# Patient Record
Sex: MALE | Race: WHITE
[De-identification: names, ages, dates, MRNs, and addresses within clinical notes are randomized per-mention and may not be internally consistent; named-entity substitution may affect disease eponyms.]

---

## 2019-09-09 ENCOUNTER — HOSPITAL ENCOUNTER (OUTPATIENT)
Dept: HOSPITAL 19 - COL.RAD | Age: 69
End: 2019-09-09
Attending: ANESTHESIOLOGY
Payer: COMMERCIAL

## 2019-09-09 DIAGNOSIS — M51.17: Primary | ICD-10-CM

## 2019-09-09 DIAGNOSIS — M12.88: ICD-10-CM

## 2019-10-02 ENCOUNTER — HOSPITAL ENCOUNTER (OUTPATIENT)
Dept: HOSPITAL 19 - MHCPAIN | Age: 69
End: 2019-10-02
Attending: ANESTHESIOLOGY
Payer: COMMERCIAL

## 2019-10-02 DIAGNOSIS — M47.817: ICD-10-CM

## 2019-10-02 DIAGNOSIS — G89.29: Primary | ICD-10-CM

## 2019-10-02 DIAGNOSIS — M53.3: ICD-10-CM

## 2019-10-02 PROCEDURE — G0463 HOSPITAL OUTPT CLINIC VISIT: HCPCS

## 2019-10-07 ENCOUNTER — HOSPITAL ENCOUNTER (OUTPATIENT)
Dept: HOSPITAL 19 - MHCPAIN | Age: 69
End: 2019-10-07
Attending: ANESTHESIOLOGY
Payer: COMMERCIAL

## 2019-10-07 DIAGNOSIS — M53.3: ICD-10-CM

## 2019-10-07 DIAGNOSIS — M54.16: ICD-10-CM

## 2019-10-07 DIAGNOSIS — M47.817: Primary | ICD-10-CM

## 2019-10-07 PROCEDURE — G0260 INJ FOR SACROILIAC JT ANESTH: HCPCS

## 2019-11-05 ENCOUNTER — HOSPITAL ENCOUNTER (OUTPATIENT)
Dept: HOSPITAL 19 - MHCPAIN | Age: 69
End: 2019-11-05
Attending: ANESTHESIOLOGY
Payer: COMMERCIAL

## 2019-11-05 DIAGNOSIS — G89.29: Primary | ICD-10-CM

## 2019-11-05 DIAGNOSIS — M53.3: ICD-10-CM

## 2019-11-05 DIAGNOSIS — M47.817: ICD-10-CM

## 2019-11-05 PROCEDURE — G0463 HOSPITAL OUTPT CLINIC VISIT: HCPCS

## 2024-09-24 ENCOUNTER — HOSPITAL ENCOUNTER (OUTPATIENT)
Dept: HOSPITAL 19 - SDCO | Age: 74
Setting detail: OBSERVATION
LOS: 2 days | Discharge: HOME | End: 2024-09-26
Attending: UROLOGY | Admitting: UROLOGY
Payer: COMMERCIAL

## 2024-09-24 VITALS — HEART RATE: 73 BPM | TEMPERATURE: 98.7 F | SYSTOLIC BLOOD PRESSURE: 130 MMHG | DIASTOLIC BLOOD PRESSURE: 76 MMHG

## 2024-09-24 VITALS — HEART RATE: 58 BPM | DIASTOLIC BLOOD PRESSURE: 63 MMHG | SYSTOLIC BLOOD PRESSURE: 124 MMHG

## 2024-09-24 VITALS — HEART RATE: 63 BPM | DIASTOLIC BLOOD PRESSURE: 62 MMHG | SYSTOLIC BLOOD PRESSURE: 129 MMHG | TEMPERATURE: 98.3 F

## 2024-09-24 VITALS — SYSTOLIC BLOOD PRESSURE: 129 MMHG | DIASTOLIC BLOOD PRESSURE: 62 MMHG | HEART RATE: 64 BPM

## 2024-09-24 VITALS — DIASTOLIC BLOOD PRESSURE: 60 MMHG | SYSTOLIC BLOOD PRESSURE: 127 MMHG | HEART RATE: 58 BPM

## 2024-09-24 VITALS — DIASTOLIC BLOOD PRESSURE: 73 MMHG | HEART RATE: 59 BPM | TEMPERATURE: 97.3 F | SYSTOLIC BLOOD PRESSURE: 124 MMHG

## 2024-09-24 VITALS — BODY MASS INDEX: 30.97 KG/M2 | WEIGHT: 192.68 LBS | HEIGHT: 66 IN

## 2024-09-24 VITALS — HEART RATE: 59 BPM | SYSTOLIC BLOOD PRESSURE: 126 MMHG | DIASTOLIC BLOOD PRESSURE: 63 MMHG

## 2024-09-24 VITALS — HEART RATE: 63 BPM | DIASTOLIC BLOOD PRESSURE: 62 MMHG | SYSTOLIC BLOOD PRESSURE: 119 MMHG

## 2024-09-24 VITALS — SYSTOLIC BLOOD PRESSURE: 129 MMHG

## 2024-09-24 VITALS — DIASTOLIC BLOOD PRESSURE: 74 MMHG | HEART RATE: 66 BPM | SYSTOLIC BLOOD PRESSURE: 130 MMHG

## 2024-09-24 VITALS — SYSTOLIC BLOOD PRESSURE: 130 MMHG

## 2024-09-24 DIAGNOSIS — K21.9: ICD-10-CM

## 2024-09-24 DIAGNOSIS — C64.1: Primary | ICD-10-CM

## 2024-09-24 DIAGNOSIS — C61: ICD-10-CM

## 2024-09-24 PROCEDURE — A9284 NON-ELECTRONIC SPIROMETER: HCPCS

## 2024-09-24 PROCEDURE — G0378 HOSPITAL OBSERVATION PER HR: HCPCS

## 2024-09-24 PROCEDURE — A4314 CATH W/DRAINAGE 2-WAY LATEX: HCPCS

## 2024-09-24 NOTE — NUR
Pt awake in bed with complaints of pain rating 10/10. Pt states that his pain
is located in the back of his neck. Scheduled Tylenol administered per mar.
Kpad in place on back of pt neck per pt request. Pt expresses frustration with
this nurse regarding pain medication stating that his pain medication is 2
hours late. This nurse educated pt regarding PRN pain medication and that pt
was not due for PRN pain medication and last time given per mar. PRN Ashley
administered per mar. Shift assessment completed. VSS. Kelley catheter in place
draining hazy reddish/pink urine into kelley catheter bag with no
complications. ABD lap sites x5 patent with no swelling, redness, or drainage.
EVA drain intact draining bright red drainage with no complications. Pt has no
request at this time. Call light within reach.

## 2024-09-24 NOTE — NUR
Pt's wife called and was being very short with ASH Mcrae.  She stated pt
called her stating he was in a lot of pain and that his medications were late.
 She wanted to know if she needed to called the Dr herself.  She would not let
me explain that some of his medications are scheduled and that they were given
an an appropriate time and explain PRN medications.  She just continued for
several minutes repeating herself.  Finally I stated that I would need to get
off the phone wit her so that I could go and assess him and see what could be
done for pain medication.  Pts scheduled medications were given at an
appropriate time.  Pt appeared to be resting comfortably.  He then stated that
his pain was 25/10 when I asked him to rate it.  I then stated, "this is the
worst pain you have even been in and that 10 is the highest the scale goes."
Pt then stated, no, not the worst, I'll give it a 9.  Pt then stated that he
has neck problems in which I do see that he does have a k-pad on.  He was able
to get a PRN peace, which I did give at this time.  Pt denies any other needs
at this time, will continue to monitor

## 2024-09-24 NOTE — NUR
Bedside report received from ASH Varner. Pt awake in bed with no complaints. Call
light within reach.

## 2024-09-24 NOTE — NUR
PATIENT UP TO FLOOR AT APPROXIMATELY 1455. POST OP VITALS RUNNING AND WNL.
POST OP FLUIDS INFUSING INTO LEFT AC. PATIENT DROWSY BUT AROUSABLE. PATIENT
TOLERATING ICE CHIPS. NO FURTHER NEEDS. CALL LIGHT IN REACH.

## 2024-09-24 NOTE — NUR
PATIENT ALERT AND ORIENTED BUT VERY DROWSY. PATIENT REPORTS INCREASED PAIN, VS
AND PATIENT'S DROWSINESS APPEAR THAT PAIN IS SOMEWHAT CONTROLLED. PATIENT
RESTING IN BED. IV FLUIDS INFUSING LEFT AC. NO FURTHER NEEDS. CALL LIGHT IN
REACH.

## 2024-09-25 VITALS — TEMPERATURE: 98.1 F | HEART RATE: 59 BPM | DIASTOLIC BLOOD PRESSURE: 66 MMHG | SYSTOLIC BLOOD PRESSURE: 132 MMHG

## 2024-09-25 VITALS — TEMPERATURE: 98.1 F | SYSTOLIC BLOOD PRESSURE: 133 MMHG | HEART RATE: 57 BPM | DIASTOLIC BLOOD PRESSURE: 68 MMHG

## 2024-09-25 VITALS — TEMPERATURE: 98.3 F | SYSTOLIC BLOOD PRESSURE: 159 MMHG | DIASTOLIC BLOOD PRESSURE: 67 MMHG | HEART RATE: 57 BPM

## 2024-09-25 VITALS — SYSTOLIC BLOOD PRESSURE: 132 MMHG

## 2024-09-25 VITALS — SYSTOLIC BLOOD PRESSURE: 131 MMHG | TEMPERATURE: 98.1 F | DIASTOLIC BLOOD PRESSURE: 69 MMHG | HEART RATE: 61 BPM

## 2024-09-25 VITALS — SYSTOLIC BLOOD PRESSURE: 153 MMHG | HEART RATE: 67 BPM | TEMPERATURE: 98.1 F | DIASTOLIC BLOOD PRESSURE: 73 MMHG

## 2024-09-25 VITALS — HEART RATE: 59 BPM | DIASTOLIC BLOOD PRESSURE: 68 MMHG | SYSTOLIC BLOOD PRESSURE: 132 MMHG | TEMPERATURE: 97.4 F

## 2024-09-25 VITALS — TEMPERATURE: 98 F | SYSTOLIC BLOOD PRESSURE: 171 MMHG | DIASTOLIC BLOOD PRESSURE: 77 MMHG | HEART RATE: 56 BPM

## 2024-09-25 VITALS — SYSTOLIC BLOOD PRESSURE: 153 MMHG

## 2024-09-25 VITALS — SYSTOLIC BLOOD PRESSURE: 150 MMHG

## 2024-09-25 VITALS — SYSTOLIC BLOOD PRESSURE: 171 MMHG

## 2024-09-25 VITALS — SYSTOLIC BLOOD PRESSURE: 133 MMHG

## 2024-09-25 LAB
ANION GAP SERPL CALC-SCNC: 9 MMOL/L (ref 7–16)
BUN SERPL-MCNC: 25 MG/DL (ref 8–26)
CALCIUM SERPL-MCNC: 8.8 MG/DL (ref 8.4–10.2)
CHLORIDE SERPL-SCNC: 107 MEQ/L (ref 98–107)
CREAT SERPL-SCNC: 1.28 MG/DL (ref 0.72–1.25)
GLUCOSE SERPL-MCNC: 116 MG/DL (ref 70–99)
HCT VFR BLD AUTO: 38 % (ref 42–52)
HGB BLD-MCNC: 12.4 G/DL (ref 13.5–18)
POTASSIUM SERPL-SCNC: 5 MEQ/L (ref 3.5–4.5)
SODIUM SERPL-SCNC: 136 MEQ/L (ref 136–145)

## 2024-09-25 NOTE — NUR
met with patient Griffin and wife Bren (P# [phone]) at
bedside to discuss discharge planning. Patient stated that he lives in [facility] with his wife Bren and that she is his DPOA. He relayed that [facility], the pharmacy that he uses is [facility], and that
he has no trouble accessing or affording his medication as [facility] covers it.
Patient denied using any DMEs at home, reported that he is independent in ADLs
and transportation needs, and that he does not and has not used any home
services. Patient denied any additional concerns regarding discharge at this
time.
 
Plan: D/C Home

## 2024-09-25 NOTE — NUR
Pt has complaints of pain in ABD rating 7/10. PRN Roxicodone and scheduled
Tylenol administered per emar. Pt states he feels pain his not being managed
well during hospital stay. This nurse provided education regarding pain
following procedure and pain goal number. Pt stated he understands pain will
not be at a zero but states his pain goal is 3 or 4 out 10 on numeric scale.
Pt has no further request at this time. Call light within reach.

## 2024-09-25 NOTE — NUR
PATIENT ALERT AND ORIENTED X4. VSS. PATIENT HERE FOR ROBOTIC PARTIAL
NEPHRECTOMY. LAPS X5 CDI WITH SKIN GLUE. PATIENT ON RA. IV TO LEFT WRIST WITH
LR RUNNING AT 125ML/HOUR. PATIENT REPORTS PAIN 8/10, REQUESTS PRN PAIN MEDS.
NO FURTHER NEEDS. CALL LIGHT IN REACH.

## 2024-09-25 NOTE — NUR
UPON SHIFT ASSESSMENT, YAMILET WAS IN BEDSIDE RECLINER AND A&O X4. HE C/O 7/10
ABDOMINAL PAIN AND PRN NORCO WAS GIVEN. LAP SITES ARE GLUED AND EDGES WELL
APPROXIMATED, HOWEVER, UMBILICUS LAP SIDE SLIGHTLY RED AND WARM-NO DRAINAGE
NOTED. FORMER EVA SITE COVERED IN GAUZE AND TEGADERM-GAUZE MODERATE BRIGHT RED
BLOOD-NO ACTIVE BLEEDING. PATIENT VS ARE WNL, WITH EXCEPTION OF BP-171
SYSTOLIC D/T PAIN-WILL REASSESS FOLLOWING NORCO. CALL LIGHT WITHIN REACH.

## 2024-09-26 VITALS — SYSTOLIC BLOOD PRESSURE: 134 MMHG

## 2024-09-26 VITALS — HEART RATE: 65 BPM | TEMPERATURE: 98.4 F

## 2024-09-26 VITALS — SYSTOLIC BLOOD PRESSURE: 107 MMHG | HEART RATE: 60 BPM | TEMPERATURE: 98.5 F | DIASTOLIC BLOOD PRESSURE: 62 MMHG

## 2024-09-26 VITALS — TEMPERATURE: 98 F | SYSTOLIC BLOOD PRESSURE: 134 MMHG | HEART RATE: 56 BPM | DIASTOLIC BLOOD PRESSURE: 71 MMHG

## 2024-09-26 VITALS — SYSTOLIC BLOOD PRESSURE: 107 MMHG

## 2024-09-26 VITALS — SYSTOLIC BLOOD PRESSURE: 131 MMHG

## 2024-09-26 NOTE — NUR
Initial visit; Patient thanked  for stopping and states he is
preparing to be discharged and declines spiritual care at this time.

## 2024-09-26 NOTE — NUR
PATIENT DISCHARGING HOME VIA WC TO PERSONAL VEHICLE WITH DAUGHTER. GAVE
DISCHARGE INSTRUCTIONS, E-SCRIPT SENT, DISCUSSED F/U APT AND ANSWERED
QUESTIONS/CONCERNS. DC'D LEFT AC IV AND COVERED SITE WITH GAUZE & COBAN.
PATIENT IS DRESSED, PACKED, AND DISCHARGED TO HOME.